# Patient Record
Sex: FEMALE | Race: WHITE | NOT HISPANIC OR LATINO
[De-identification: names, ages, dates, MRNs, and addresses within clinical notes are randomized per-mention and may not be internally consistent; named-entity substitution may affect disease eponyms.]

---

## 2016-12-31 NOTE — ED PROVIDER NOTE - CARE PLAN
Principal Discharge DX:	DKA (diabetic ketoacidoses)  Secondary Diagnosis:	GIB (gastrointestinal bleeding)

## 2016-12-31 NOTE — ED PROVIDER NOTE - OBJECTIVE STATEMENT
50 y/o w/f IDDM with abdominal pain, N/V, hematemesis, no po intake x two days,  ketones on breath, glucose > 400 at home

## 2016-12-31 NOTE — H&P ADULT. - HISTORY OF PRESENT ILLNESS
52 y/o w/f IDDM with abdominal pain, N/V, hematemesis, no po intake x two days,  ketones on breath, glucose > 400 at home, in the er found to be in DKA

## 2016-12-31 NOTE — ED ADULT NURSE NOTE - OBJECTIVE STATEMENT
patient arrived to ED lethargic in DKA with a reported elevated blood sugar for patient's partner.  patient has insulin pump (detached and kept by partner).  patient to receive fluid resuscitation, MD Boyd at bedside to establish EJ IV access.  pending initial lab results.  EKG completed, blood sugar too high to be read by glucometer.

## 2016-12-31 NOTE — H&P ADULT. - ASSESSMENT
50 y/o w/f IDDM with abdominal pain, N/V, hematemesis, no po intake x two days,  ketones on breath, glucose > 400 at home, in the er found to be in DKA

## 2016-12-31 NOTE — ED PROVIDER NOTE - CONSTITUTIONAL, MLM
normal... Well appearing, well nourished, awake, alert, oriented to person, place, time/situation and in moderate distress.

## 2017-01-01 NOTE — DIETITIAN INITIAL EVALUATION ADULT. - PERTINENT LABORATORY DATA
1/1 H/H 8.9/27.5 Na 146 Cl 112 Glu 159 BUN/Crea 41/3.4 Ca 8.2 1/1 H/H 8.9/27.5 Na 146 Cl 112 Glu 159 BUN/Crea 41/3.4 Ca 8.2 12/31 HgbA1c 12.7

## 2017-01-01 NOTE — DIETITIAN INITIAL EVALUATION ADULT. - OTHER INFO
Pt sleeping soundly in bed. Breakfast tray at bedside, untouched. Written information re: consistent carb diet left at pt's bedside with name and number of RD.

## 2017-01-03 ENCOUNTER — TRANSCRIPTION ENCOUNTER (OUTPATIENT)
Age: 52
End: 2017-01-03

## 2017-01-03 NOTE — PHYSICAL THERAPY INITIAL EVALUATION ADULT - ADDITIONAL COMMENTS
Pt lives with her partner in a house /c 3 BILL and none inside, no handrails. Pt was fully independent /c all functional mobility dn ADLs prior to admission. Pt drives and works. Pt reports partner does not work. Pt reports she has an insulin pump that has "malfunctioned" and caused her to go in DKA. She has contracted the pump company but pt was unclear about results/outcome.

## 2017-01-03 NOTE — DISCHARGE NOTE ADULT - NS AS DC AMI ACE CONTRA
other reasons documented by Physician / Nurse Practitioner / Physician Assistant  or Pharmacist for not prescribing an ACEI AND not prescibing an ARB at discharge

## 2017-01-03 NOTE — PHYSICAL THERAPY INITIAL EVALUATION ADULT - BALANCE DISTURBANCE, IDENTIFIED IMPAIRMENT CONTRIBUTE, REHAB EVAL
decreased strength/impaired motor control/impaired postural control/impaired coordination/abnormal muscle tone

## 2017-01-03 NOTE — DISCHARGE NOTE ADULT - NS AS ACTIVITY OBS
Bathing allowed/Do not make important decisions/Do not drive or operate machinery/No Heavy lifting/straining

## 2017-01-03 NOTE — DISCHARGE NOTE ADULT - CARE PROVIDER_API CALL
Andrew Rahman), Cardiovascular Disease; Internal Medicine  [address] [place] [address]  Phone: [phone]  Fax: [phone]

## 2017-01-03 NOTE — DISCHARGE NOTE ADULT - NS MD DC FALL RISK RISK
For information on Fall & Injury Prevention, visit [email or web address]

## 2017-01-03 NOTE — DISCHARGE NOTE ADULT - PATIENT PORTAL LINK FT
“You can access the FollowHealth Patient Portal, offered by [facility], by registering with the following website: [email or web address]”

## 2017-01-03 NOTE — DISCHARGE NOTE ADULT - CARE PLAN
Principal Discharge DX:	DKA (diabetic ketoacidoses)  Goal:	resolve  Instructions for follow-up, activity and diet:	pt transferred to [place] for [facility] Principal Discharge DX:	DKA (diabetic ketoacidoses)  Goal:	resolve  Instructions for follow-up, activity and diet:	pt transferred to [place] for [facility] Principal Discharge DX:	DKA (diabetic ketoacidoses)  Goal:	resolve  Instructions for follow-up, activity and diet:	pt transferred to [place] for [facility]

## 2017-01-03 NOTE — DISCHARGE NOTE ADULT - HOSPITAL COURSE
to be completed by attending 52 y/o w/f IDDM with abdominal pain, N/V, hematemesis, no po intake x two days,  ketones on breath, glucose > 400 at home, in the er found to be in DKA  Found to have NSTEMI. Transfer to [place] for cath

## 2017-01-03 NOTE — PHYSICAL THERAPY INITIAL EVALUATION ADULT - IMPAIRMENTS FOUND, PT EVAL
decreased midline orientation/Stairs/arousal, attention, and cognition/gait, locomotion, and balance/muscle strength/tone/poor safety awareness/posture/aerobic capacity/endurance

## 2017-01-03 NOTE — DISCHARGE NOTE ADULT - MEDICATION SUMMARY - MEDICATIONS TO TAKE
I will START or STAY ON the medications listed below when I get home from the hospital:    HYDROmorphone  -- 0.5 milligram(s) intravenous every 3 hours, As Neededfor breakthrough pain  -- Indication: For pain    aspirin 81 mg oral tablet, chewable  -- 1 tab(s) by mouth once a day  -- Indication: For cad    acetaminophen 325 mg oral tablet  -- 2 tab(s) by mouth every 6 hours, As needed, Mild Pain (1 - 3)  -- Indication: For pain    acetaminophen 325 mg oral tablet  -- 2 tab(s) by mouth every 6 hours, As needed, For Temp greater than 38 C (100.4 F)  -- Indication: For pain    HumaLOG  -- 10 unit(s) subcutaneous 3 times a day (before meals) please check accucheck / adjust dose as per it / insulin pump  -- Indication: For DM    insulin glargine  -- 15 unit(s) subcutaneous once a day (at bedtime)  -- Indication: For DM    atorvastatin 40 mg oral tablet  -- 1 tab(s) by mouth once a day (at bedtime)  -- Indication: For HLD    clopidogrel 75 mg oral tablet  -- 1 tab(s) by mouth once a day  -- Indication: For CAD    metoprolol succinate 25 mg oral tablet, extended release  -- 1 tab(s) by mouth once a day  -- Indication: For HTN    amLODIPine 5 mg oral tablet  -- 1 tab(s) by mouth once a day  -- Indication: For HTN    docusate sodium 100 mg oral capsule  -- 1 cap(s) by mouth 3 times a day  -- Indication: For constipation    polyethylene glycol 3350 oral powder for reconstitution  -- 17 gram(s) by mouth once a day (at bedtime)  -- Indication: For constipation    pantoprazole 40 mg oral delayed release tablet  -- 1 tab(s) by mouth once a day (before a meal)  -- Indication: For GERD    hydrALAZINE 25 mg oral tablet  -- 1 tab(s) by mouth every 8 hours  -- Indication: For HTN

## 2017-01-03 NOTE — PHYSICAL THERAPY INITIAL EVALUATION ADULT - GENERAL OBSERVATIONS, REHAB EVAL
Pt received in bed in ICU, was groggy and reporting pain. Pt was medicated prior to PT /c Dilaudid. Pt agreeable /c PT eval

## 2017-01-03 NOTE — PHYSICAL THERAPY INITIAL EVALUATION ADULT - PERTINENT HX OF CURRENT PROBLEM, REHAB EVAL
As per H&P: "50 y/o w/f IDDM with abdominal pain, N/V, hematemesis, no po intake x two days,  ketones on breath, glucose > 400 at home, in the er found to be in DKA"

## 2017-01-03 NOTE — PHYSICAL THERAPY INITIAL EVALUATION ADULT - LEVEL OF INDEPENDENCE: SUPINE/SIT, REHAB EVAL
minimum assist (75% patients effort)/moderate assist (50% patients effort)/pt c/o dizziness but resolved. Pt /c low tone

## 2017-01-03 NOTE — PHYSICAL THERAPY INITIAL EVALUATION ADULT - PLANNED THERAPY INTERVENTIONS, PT EVAL
transfer training/Assess stairs if and when appropriate/gait training/balance training/bed mobility training

## 2017-01-03 NOTE — PHYSICAL THERAPY INITIAL EVALUATION ADULT - PATIENT PROFILE REVIEW, REHAB EVAL
yes/Pt admitted for DKA yes/Pt admitted for DKA and SOB. Pt admits to having DM and an insulin pump. Pt also reports having a acoustic neuroma removed on right side

## 2017-01-03 NOTE — PHYSICAL THERAPY INITIAL EVALUATION ADULT - TRANSFER SAFETY CONCERNS NOTED: SIT/STAND, REHAB EVAL
decreased safety awareness/decreased proprioception/decreased weight-shifting ability/losing balance/decreased balance during turns

## 2017-01-03 NOTE — PHYSICAL THERAPY INITIAL EVALUATION ADULT - GAIT DEVIATIONS NOTED, PT EVAL
decreased step length/decreased velocity of limb motion/decreased christine/decreased stride length/decreased weight-shifting ability

## 2017-01-03 NOTE — PHYSICAL THERAPY INITIAL EVALUATION ADULT - IMPAIRMENTS CONTRIBUTING TO GAIT DEVIATIONS, PT EVAL
impaired coordination/decreased strength/impaired postural control/impaired balance/cognition/impaired motor control

## 2017-01-03 NOTE — PHYSICAL THERAPY INITIAL EVALUATION ADULT - SKIN COLOR/CHARACTERISTICS
Swelling t/o LUE. Puffiness to face noted and right side facial droop. Pt says this is not new. Pt had a "brain tumor" removed

## 2017-01-05 ENCOUNTER — INPATIENT (INPATIENT)
Facility: HOSPITAL | Age: 52
LOS: 5 days | Discharge: HOME | DRG: 281 | End: 2017-01-11
Attending: INTERNAL MEDICINE | Admitting: INTERNAL MEDICINE
Payer: COMMERCIAL

## 2017-01-05 VITALS
DIASTOLIC BLOOD PRESSURE: 71 MMHG | HEART RATE: 87 BPM | RESPIRATION RATE: 19 BRPM | SYSTOLIC BLOOD PRESSURE: 143 MMHG | TEMPERATURE: 98 F | OXYGEN SATURATION: 97 %

## 2017-01-05 DIAGNOSIS — I21.4 NON-ST ELEVATION (NSTEMI) MYOCARDIAL INFARCTION: ICD-10-CM

## 2017-01-05 DIAGNOSIS — D36.10 BENIGN NEOPLASM OF PERIPHERAL NERVES AND AUTONOMIC NERVOUS SYSTEM, UNSPECIFIED: Chronic | ICD-10-CM

## 2017-01-05 DIAGNOSIS — T14.90 INJURY, UNSPECIFIED: Chronic | ICD-10-CM

## 2017-01-05 LAB
APTT BLD: 28.4 SEC — SIGNIFICANT CHANGE UP (ref 27.5–37.4)
CK MB CFR SERPL CALC: 1 NG/ML — SIGNIFICANT CHANGE UP (ref 0–3.8)
CK SERPL-CCNC: 21 U/L — LOW (ref 25–170)
INR BLD: 1.04 RATIO — SIGNIFICANT CHANGE UP (ref 0.88–1.16)
PROTHROM AB SERPL-ACNC: 11.2 SEC — SIGNIFICANT CHANGE UP (ref 10–13.1)
TROPONIN T SERPL-MCNC: 0.34 NG/ML — HIGH (ref 0–0.06)

## 2017-01-05 PROCEDURE — 93010 ELECTROCARDIOGRAM REPORT: CPT

## 2017-01-05 RX ORDER — CLOPIDOGREL BISULFATE 75 MG/1
75 TABLET, FILM COATED ORAL DAILY
Qty: 0 | Refills: 0 | Status: DISCONTINUED | OUTPATIENT
Start: 2017-01-05 | End: 2017-01-07

## 2017-01-05 RX ORDER — HYDRALAZINE HCL 50 MG
25 TABLET ORAL EVERY 8 HOURS
Qty: 0 | Refills: 0 | Status: DISCONTINUED | OUTPATIENT
Start: 2017-01-05 | End: 2017-01-11

## 2017-01-05 RX ORDER — METOPROLOL TARTRATE 50 MG
25 TABLET ORAL DAILY
Qty: 0 | Refills: 0 | Status: DISCONTINUED | OUTPATIENT
Start: 2017-01-05 | End: 2017-01-11

## 2017-01-05 RX ORDER — INSULIN LISPRO 100/ML
VIAL (ML) SUBCUTANEOUS AT BEDTIME
Qty: 0 | Refills: 0 | Status: DISCONTINUED | OUTPATIENT
Start: 2017-01-05 | End: 2017-01-11

## 2017-01-05 RX ORDER — ATORVASTATIN CALCIUM 80 MG/1
40 TABLET, FILM COATED ORAL AT BEDTIME
Qty: 0 | Refills: 0 | Status: DISCONTINUED | OUTPATIENT
Start: 2017-01-05 | End: 2017-01-11

## 2017-01-05 RX ORDER — HEPARIN SODIUM 5000 [USP'U]/ML
INJECTION INTRAVENOUS; SUBCUTANEOUS
Qty: 25000 | Refills: 0 | Status: DISCONTINUED | OUTPATIENT
Start: 2017-01-05 | End: 2017-01-06

## 2017-01-05 RX ORDER — SODIUM CHLORIDE 9 MG/ML
1000 INJECTION INTRAMUSCULAR; INTRAVENOUS; SUBCUTANEOUS
Qty: 0 | Refills: 0 | Status: COMPLETED | OUTPATIENT
Start: 2017-01-05 | End: 2017-01-06

## 2017-01-05 RX ORDER — INSULIN GLARGINE 100 [IU]/ML
10 INJECTION, SOLUTION SUBCUTANEOUS AT BEDTIME
Qty: 0 | Refills: 0 | Status: DISCONTINUED | OUTPATIENT
Start: 2017-01-05 | End: 2017-01-06

## 2017-01-05 RX ORDER — DEXTROSE 50 % IN WATER 50 %
25 SYRINGE (ML) INTRAVENOUS ONCE
Qty: 0 | Refills: 0 | Status: DISCONTINUED | OUTPATIENT
Start: 2017-01-05 | End: 2017-01-11

## 2017-01-05 RX ORDER — DOCUSATE SODIUM 100 MG
100 CAPSULE ORAL THREE TIMES A DAY
Qty: 0 | Refills: 0 | Status: DISCONTINUED | OUTPATIENT
Start: 2017-01-05 | End: 2017-01-11

## 2017-01-05 RX ORDER — PANTOPRAZOLE SODIUM 20 MG/1
40 TABLET, DELAYED RELEASE ORAL
Qty: 0 | Refills: 0 | Status: DISCONTINUED | OUTPATIENT
Start: 2017-01-05 | End: 2017-01-11

## 2017-01-05 RX ORDER — HEPARIN SODIUM 5000 [USP'U]/ML
4000 INJECTION INTRAVENOUS; SUBCUTANEOUS EVERY 6 HOURS
Qty: 0 | Refills: 0 | Status: DISCONTINUED | OUTPATIENT
Start: 2017-01-05 | End: 2017-01-06

## 2017-01-05 RX ORDER — INSULIN LISPRO 100/ML
3 VIAL (ML) SUBCUTANEOUS
Qty: 0 | Refills: 0 | Status: DISCONTINUED | OUTPATIENT
Start: 2017-01-05 | End: 2017-01-06

## 2017-01-05 RX ORDER — DEXTROSE 50 % IN WATER 50 %
12.5 SYRINGE (ML) INTRAVENOUS ONCE
Qty: 0 | Refills: 0 | Status: DISCONTINUED | OUTPATIENT
Start: 2017-01-05 | End: 2017-01-11

## 2017-01-05 RX ORDER — AMLODIPINE BESYLATE 2.5 MG/1
5 TABLET ORAL DAILY
Qty: 0 | Refills: 0 | Status: DISCONTINUED | OUTPATIENT
Start: 2017-01-05 | End: 2017-01-09

## 2017-01-05 RX ORDER — DEXTROSE 50 % IN WATER 50 %
1 SYRINGE (ML) INTRAVENOUS ONCE
Qty: 0 | Refills: 0 | Status: DISCONTINUED | OUTPATIENT
Start: 2017-01-05 | End: 2017-01-11

## 2017-01-05 RX ORDER — GLUCAGON INJECTION, SOLUTION 0.5 MG/.1ML
1 INJECTION, SOLUTION SUBCUTANEOUS ONCE
Qty: 0 | Refills: 0 | Status: DISCONTINUED | OUTPATIENT
Start: 2017-01-05 | End: 2017-01-11

## 2017-01-05 RX ORDER — SODIUM CHLORIDE 9 MG/ML
1000 INJECTION, SOLUTION INTRAVENOUS
Qty: 0 | Refills: 0 | Status: DISCONTINUED | OUTPATIENT
Start: 2017-01-05 | End: 2017-01-11

## 2017-01-05 RX ORDER — ASPIRIN/CALCIUM CARB/MAGNESIUM 324 MG
81 TABLET ORAL DAILY
Qty: 0 | Refills: 0 | Status: DISCONTINUED | OUTPATIENT
Start: 2017-01-05 | End: 2017-01-11

## 2017-01-05 RX ORDER — INSULIN LISPRO 100/ML
VIAL (ML) SUBCUTANEOUS
Qty: 0 | Refills: 0 | Status: DISCONTINUED | OUTPATIENT
Start: 2017-01-05 | End: 2017-01-11

## 2017-01-05 RX ORDER — ACETYLCYSTEINE 200 MG/ML
1200 VIAL (ML) MISCELLANEOUS
Qty: 0 | Refills: 0 | Status: COMPLETED | OUTPATIENT
Start: 2017-01-05 | End: 2017-01-07

## 2017-01-05 RX ADMIN — HEPARIN SODIUM 900 UNIT(S)/HR: 5000 INJECTION INTRAVENOUS; SUBCUTANEOUS at 22:34

## 2017-01-05 RX ADMIN — AMLODIPINE BESYLATE 5 MILLIGRAM(S): 2.5 TABLET ORAL at 20:44

## 2017-01-05 RX ADMIN — Medication 1200 MILLIGRAM(S): at 22:35

## 2017-01-05 RX ADMIN — SODIUM CHLORIDE 75 MILLILITER(S): 9 INJECTION INTRAMUSCULAR; INTRAVENOUS; SUBCUTANEOUS at 20:52

## 2017-01-05 RX ADMIN — Medication 81 MILLIGRAM(S): at 18:58

## 2017-01-05 RX ADMIN — Medication 2: at 20:03

## 2017-01-05 RX ADMIN — Medication 1: at 22:35

## 2017-01-05 RX ADMIN — Medication 25 MILLIGRAM(S): at 20:44

## 2017-01-05 RX ADMIN — PANTOPRAZOLE SODIUM 40 MILLIGRAM(S): 20 TABLET, DELAYED RELEASE ORAL at 18:58

## 2017-01-05 RX ADMIN — Medication 3 UNIT(S): at 20:02

## 2017-01-05 RX ADMIN — Medication 25 MILLIGRAM(S): at 18:58

## 2017-01-05 RX ADMIN — ATORVASTATIN CALCIUM 40 MILLIGRAM(S): 80 TABLET, FILM COATED ORAL at 20:44

## 2017-01-05 RX ADMIN — CLOPIDOGREL BISULFATE 75 MILLIGRAM(S): 75 TABLET, FILM COATED ORAL at 18:58

## 2017-01-05 RX ADMIN — INSULIN GLARGINE 10 UNIT(S): 100 INJECTION, SOLUTION SUBCUTANEOUS at 22:34

## 2017-01-05 NOTE — PATIENT PROFILE ADULT. - ABILITY TO HEAR (WITH HEARING AID OR HEARING APPLIANCE IF NORMALLY USED):
Mildly to Moderately Impaired: difficulty hearing in some environments or speaker may need to increase volume or speak distinctly/Deaf right ear

## 2017-01-05 NOTE — H&P CARDIOLOGY - PMH
Acoustic neuroma    Diabetes  insulin pump  HTN (hypertension) Acoustic neuroma    CKD (chronic kidney disease) stage 4, GFR 15-29 ml/min    Diabetes  insulin pump  HLD (hyperlipidemia)    HTN (hypertension)    Pancreatitis

## 2017-01-05 NOTE — H&P CARDIOLOGY - FAMILY HISTORY
No pertinent family history in first degree relatives Mother  Still living? Unknown  FH: lung cancer, Age at diagnosis: Age Unknown  FH: CHF (congestive heart failure), Age at diagnosis: Age Unknown     Father  Still living? Unknown  FH: emphysema, Age at diagnosis: Age Unknown     Sibling  Still living? Unknown  FH: CVA (cerebrovascular accident), Age at diagnosis: Age Unknown

## 2017-01-05 NOTE — H&P CARDIOLOGY - HISTORY OF PRESENT ILLNESS
52 yo female PMH IDDM (A1c 12.7), acoustic neuroma, HTN, CKD presents to [facility] on 12/31/16 with insulin pump failure and c/o abdominal pain, N/V, hematemesis, decrease po intake PTA. Patient blood glucose >400, patient was admitted to ICU and treat for DKA. Anion gap is now closed, diabetes now managed according to sliding scale. While in the ICU, on 12/31- 1/1/17 cardiac biomarkers x 3 were uptrending (10.8-> 16.3 -> 10.5  Patient transferred to [facility] for NSTEMI on 1/5/17.     Upon arrival 52 yo female PMH DMT1 (A1c 12.7, insulin pump at home), acoustic neuroma (s/p removal with residual right eye droop and decreased hearing in right ear), HTN, HLD CKD presents to [facility] on 12/31/16 with insulin pump complications and c/o epigastric pain, N/V, decrease po intake two days PTA. In the ER blood glucose >400, patient was admitted to ICU and treat for DKA. Anion gap is now closed, diabetes now managed according to sliding scale with IV insulin. While in the ICU, on 12/31- 1/1/17 cardiac biomarkers x 3 were uptrending (10.8-> 16.3 -> 10.5)  Patient transferred to [facility] for NSTEMI on 1/5/17.     Upon arrival 50 yo female PMH DMT1 (A1c 12.7, insulin pump at home), acoustic neuroma (s/p removal with residual right eye droop and decreased hearing in right ear), HTN, HLD CKD presents to [facility] on 12/31/16 with insulin pump complications and c/o epigastric pain, N/V, decrease po intake two days PTA. In the ER blood glucose >400, patient was admitted to ICU and treat for DKA. Anion gap is now closed, diabetes now managed according to sliding scale with IV insulin. While in the ICU, on 12/31- 1/1/17 troponin I x 3 peak 16.3. Patient transferred to [facility] for NSTEMI on 1/5/17.    off note: Patient was dx with pancreatitis in Nov 2016, CTA on 1/1/17 reports findings are suggestive of pancreatitis/duodenitis.   1/2017 TTE: Stage I diastolic dysfunction, EF 55%  Upon arrival patient denies cheat pain, palpitations, SOB, n/v. 50 yo female PMH DMT1 (A1c 12.7, insulin pump at home), acoustic neuroma (s/p removal with residual right eye droop and decreased hearing in right ear), HTN, HLD CKD presents to [facility] on 12/31/16 with insulin pump complications and c/o epigastric pain, N/V, decrease po intake and cough two days PTA. In the ER blood glucose >400, patient was admitted to ICU and treat for DKA. Anion gap is now closed, diabetes now managed according to sliding scale with IV insulin. While in the ICU, on 12/31- 1/1/17 troponin I x 3 peak 16.3. Patient was medically treated and stabilized now transferred to [facility] for NSTEMI on 1/5/17. Upon arrival patient denies cheat pain, palpitations, SOB, n/v.    off note:   Patient was dx with pancreatitis in Nov 2016, follow up CT Abdomen on 1/1/17 reports findings are suggestive of pancreatitis/duodenitis.   1/2017 TTE: Stage I diastolic dysfunction, EF 55% 52 yo female PMH DMT1 (A1c 12.7, insulin pump at home), acoustic neuroma (s/p removal with residual right eye droop and decreased hearing in right ear), HTN, HLD CKD presents to [facility] on 12/31/16 with insulin pump complications and c/o epigastric pain, N/V, decrease po intake and cough two days PTA. In the ER blood glucose >400, patient was admitted to ICU and treat for DKA. Anion gap is now closed, diabetes now managed according to sliding scale with IV insulin. While in the ICU, on 12/31- 1/1/17 troponin I x 3 uptrending- peak 16.3. Patient was medically treated and stabilized now transferred to [facility] for NSTEMI on 1/5/17. Upon arrival patient denies cheat pain, palpitations, SOB, n/v.    off note:   Patient was dx with pancreatitis in Nov 2016, follow up CT Abdomen on 1/1/17 reports findings are suggestive of pancreatitis/duodenitis.   1/2017 TTE: Stage I diastolic dysfunction, EF 55%. 50 yo female PMH DMT1 (A1c 12.7, insulin pump at home), acoustic neuroma (s/p removal with residual right eye droop and decreased hearing in right ear), HTN, HLD CKD stage 4 (GFR 24, Cr 2.3) presents to [facility] on 12/31/16 with insulin pump complications and c/o epigastric pain, N/V, decrease po intake and cough two days PTA. In the ER blood glucose >400, patient was admitted to ICU and treat for DKA. Anion gap is now closed, diabetes now managed according to sliding scale with IV insulin. While in the ICU, on 12/31- 1/1/17 troponin I x 3 uptrending- peak 16.3. Patient was medically treated and stabilized now transferred to [facility] for NSTEMI on 1/5/17. Upon arrival patient denies chest pain, palpitations, SOB, n/v, PND, orthopnea    off note:  Patient was dx with pancreatitis in Nov 2016, follow up CT Abdomen on 1/1/17 reports findings are suggestive of pancreatitis/duodenitis.   1/2017 TTE: Stage I diastolic dysfunction, EF 55%.   BC x1: no growth  U/A: negative 52 yo female PMH DMT1 (A1c 12.7, insulin pump at home), acoustic neuroma (s/p removal with residual right eye droop and decreased hearing in right ear), HTN, HLD CKD stage 4 (GFR 24, Cr 2.3) presents to [facility] on 12/31/16 with insulin pump complications and c/o epigastric pain, N/V, decrease po intake and cough two days PTA. In the ER blood glucose >400, patient was admitted to ICU and treat for DKA. Anion gap is now closed, diabetes now managed according to sliding scale with IV insulin. While in the ICU, on 12/31- 1/1/17 troponin I x 3 uptrending- peak 16.3. Patient was medically treated and stabilized now transferred to [facility] for NSTEMI on 1/5/17. Upon arrival patient denies chest pain, palpitations, SOB, n/v, PND, orthopnea    Endocrine: Dr. Laith Shrestha  PCP: Dr. Lea Liriano  off note:  Patient was dx with pancreatitis in Nov 2016, follow up CT Abdomen on 1/1/17 reports findings are suggestive of pancreatitis/duodenitis.   1/2017 TTE: Stage I diastolic dysfunction, EF 55%.   BC x1: no growth  U/A: negative 52 yo female PMH DMT1 (A1c 12.7, [employer] insulin pump at home), acoustic neuroma (s/p removal with residual right eye droop and decreased hearing in right ear), HTN, HLD CKD stage 4 (GFR 24, Cr 2.3) presents to [facility] on 12/31/16 with insulin pump complications and c/o epigastric pain, N/V, decrease po intake and cough two days PTA. In the ER blood glucose >400, patient was admitted to ICU and treat for DKA. Anion gap is now closed, diabetes managed according to sliding scale with IV insulin. While in the ICU, on 12/31- 1/1/17 troponin I x 3 uptrending- peak 16.3. Patient was medically treated and stabilized now transferred to [facility] for NSTEMI on 1/5/17. Upon arrival patient denies chest pain, palpitations, SOB, n/v, PND, orthopnea    Endocrine: Dr. Laith Shrestha  PCP: Dr. Jd Liriano  off note:  Patient was dx with pancreatitis in Nov 2016, follow up CT Abdomen on 1/1/17 reports findings are suggestive of pancreatitis/duodenitis.   1/2017 TTE: Stage I diastolic dysfunction, EF 55%.   BC x1: no growth  U/A: negative

## 2017-01-06 ENCOUNTER — TRANSCRIPTION ENCOUNTER (OUTPATIENT)
Age: 52
End: 2017-01-06

## 2017-01-06 LAB
AMYLASE UR-CCNC: 26 U/L — SIGNIFICANT CHANGE UP
ANION GAP SERPL CALC-SCNC: 17 MMOL/L — SIGNIFICANT CHANGE UP (ref 5–17)
APTT BLD: 43.1 SEC — HIGH (ref 27.5–37.4)
BUN SERPL-MCNC: 23 MG/DL — SIGNIFICANT CHANGE UP (ref 7–23)
CALCIUM SERPL-MCNC: 8.9 MG/DL — SIGNIFICANT CHANGE UP (ref 8.4–10.5)
CALCIUM UR-MCNC: 2 MG/DL — SIGNIFICANT CHANGE UP
CHLORIDE SERPL-SCNC: 100 MMOL/L — SIGNIFICANT CHANGE UP (ref 96–108)
CHLORIDE UR-SCNC: 71 MMOL/L — SIGNIFICANT CHANGE UP
CK MB BLD-MCNC: 5.9 % — HIGH (ref 0–3.5)
CK MB CFR SERPL CALC: 1 NG/ML — SIGNIFICANT CHANGE UP (ref 0–3.8)
CK SERPL-CCNC: 17 U/L — LOW (ref 25–170)
CO2 SERPL-SCNC: 22 MMOL/L — SIGNIFICANT CHANGE UP (ref 22–31)
CREAT ?TM UR-MCNC: 22 MG/DL — SIGNIFICANT CHANGE UP
CREAT SERPL-MCNC: 2.26 MG/DL — HIGH (ref 0.5–1.3)
GLUCOSE SERPL-MCNC: 263 MG/DL — HIGH (ref 70–99)
HCT VFR BLD CALC: 28 % — LOW (ref 34.5–45)
HGB BLD-MCNC: 9.4 G/DL — LOW (ref 11.5–15.5)
INR BLD: 1.02 RATIO — SIGNIFICANT CHANGE UP (ref 0.88–1.16)
MCHC RBC-ENTMCNC: 29.1 PG — SIGNIFICANT CHANGE UP (ref 27–34)
MCHC RBC-ENTMCNC: 33.7 GM/DL — SIGNIFICANT CHANGE UP (ref 32–36)
MCV RBC AUTO: 86.4 FL — SIGNIFICANT CHANGE UP (ref 80–100)
OSMOLALITY UR: 331 MOS/KG — SIGNIFICANT CHANGE UP (ref 300–900)
PLATELET # BLD AUTO: 274 K/UL — SIGNIFICANT CHANGE UP (ref 150–400)
POTASSIUM SERPL-MCNC: 4.1 MMOL/L — SIGNIFICANT CHANGE UP (ref 3.5–5.3)
POTASSIUM SERPL-SCNC: 4.1 MMOL/L — SIGNIFICANT CHANGE UP (ref 3.5–5.3)
POTASSIUM UR-SCNC: 12 MMOL/L — SIGNIFICANT CHANGE UP
PROTHROM AB SERPL-ACNC: 11 SEC — SIGNIFICANT CHANGE UP (ref 10–13.1)
RBC # BLD: 3.24 M/UL — LOW (ref 3.8–5.2)
RBC # FLD: 12.6 % — SIGNIFICANT CHANGE UP (ref 10.3–14.5)
SODIUM SERPL-SCNC: 139 MMOL/L — SIGNIFICANT CHANGE UP (ref 135–145)
SODIUM UR-SCNC: 94 MMOL/L — SIGNIFICANT CHANGE UP
TROPONIN T SERPL-MCNC: 0.35 NG/ML — HIGH (ref 0–0.06)
WBC # BLD: 12.7 K/UL — HIGH (ref 3.8–10.5)
WBC # FLD AUTO: 12.7 K/UL — HIGH (ref 3.8–10.5)

## 2017-01-06 PROCEDURE — 99255 IP/OBS CONSLTJ NEW/EST HI 80: CPT

## 2017-01-06 PROCEDURE — 93458 L HRT ARTERY/VENTRICLE ANGIO: CPT | Mod: 26,GC

## 2017-01-06 PROCEDURE — 99255 IP/OBS CONSLTJ NEW/EST HI 80: CPT | Mod: GC

## 2017-01-06 PROCEDURE — 99222 1ST HOSP IP/OBS MODERATE 55: CPT | Mod: GC

## 2017-01-06 RX ORDER — ACETAMINOPHEN 500 MG
650 TABLET ORAL EVERY 6 HOURS
Qty: 0 | Refills: 0 | Status: DISCONTINUED | OUTPATIENT
Start: 2017-01-06 | End: 2017-01-11

## 2017-01-06 RX ORDER — INSULIN LISPRO 100/ML
6 VIAL (ML) SUBCUTANEOUS ONCE
Qty: 0 | Refills: 0 | Status: COMPLETED | OUTPATIENT
Start: 2017-01-06 | End: 2017-01-06

## 2017-01-06 RX ORDER — SODIUM CHLORIDE 9 MG/ML
1000 INJECTION INTRAMUSCULAR; INTRAVENOUS; SUBCUTANEOUS
Qty: 0 | Refills: 0 | Status: DISCONTINUED | OUTPATIENT
Start: 2017-01-06 | End: 2017-01-11

## 2017-01-06 RX ORDER — INSULIN GLARGINE 100 [IU]/ML
12 INJECTION, SOLUTION SUBCUTANEOUS AT BEDTIME
Qty: 0 | Refills: 0 | Status: DISCONTINUED | OUTPATIENT
Start: 2017-01-06 | End: 2017-01-07

## 2017-01-06 RX ORDER — INSULIN LISPRO 100/ML
6 VIAL (ML) SUBCUTANEOUS
Qty: 0 | Refills: 0 | Status: DISCONTINUED | OUTPATIENT
Start: 2017-01-06 | End: 2017-01-07

## 2017-01-06 RX ORDER — INSULIN LISPRO 100/ML
5 VIAL (ML) SUBCUTANEOUS
Qty: 0 | Refills: 0 | Status: DISCONTINUED | OUTPATIENT
Start: 2017-01-06 | End: 2017-01-06

## 2017-01-06 RX ADMIN — Medication 1200 MILLIGRAM(S): at 12:34

## 2017-01-06 RX ADMIN — ATORVASTATIN CALCIUM 40 MILLIGRAM(S): 80 TABLET, FILM COATED ORAL at 22:05

## 2017-01-06 RX ADMIN — Medication 1: at 08:33

## 2017-01-06 RX ADMIN — Medication 3 UNIT(S): at 12:34

## 2017-01-06 RX ADMIN — Medication 650 MILLIGRAM(S): at 22:06

## 2017-01-06 RX ADMIN — AMLODIPINE BESYLATE 5 MILLIGRAM(S): 2.5 TABLET ORAL at 17:59

## 2017-01-06 RX ADMIN — INSULIN GLARGINE 12 UNIT(S): 100 INJECTION, SOLUTION SUBCUTANEOUS at 22:14

## 2017-01-06 RX ADMIN — Medication 81 MILLIGRAM(S): at 05:16

## 2017-01-06 RX ADMIN — Medication 6 UNIT(S): at 02:30

## 2017-01-06 RX ADMIN — Medication 25 MILLIGRAM(S): at 05:16

## 2017-01-06 RX ADMIN — Medication 100 MILLIGRAM(S): at 22:06

## 2017-01-06 RX ADMIN — Medication 650 MILLIGRAM(S): at 22:36

## 2017-01-06 RX ADMIN — SODIUM CHLORIDE 75 MILLILITER(S): 9 INJECTION INTRAMUSCULAR; INTRAVENOUS; SUBCUTANEOUS at 04:18

## 2017-01-06 RX ADMIN — Medication 100 MILLIGRAM(S): at 12:34

## 2017-01-06 RX ADMIN — Medication 25 MILLIGRAM(S): at 22:06

## 2017-01-06 RX ADMIN — Medication 6: at 17:58

## 2017-01-06 RX ADMIN — CLOPIDOGREL BISULFATE 75 MILLIGRAM(S): 75 TABLET, FILM COATED ORAL at 05:16

## 2017-01-06 RX ADMIN — Medication 5 UNIT(S): at 17:58

## 2017-01-06 RX ADMIN — PANTOPRAZOLE SODIUM 40 MILLIGRAM(S): 20 TABLET, DELAYED RELEASE ORAL at 05:16

## 2017-01-06 RX ADMIN — Medication 3: at 12:34

## 2017-01-06 RX ADMIN — Medication 2: at 22:14

## 2017-01-06 RX ADMIN — Medication 1200 MILLIGRAM(S): at 22:07

## 2017-01-06 RX ADMIN — HEPARIN SODIUM 1050 UNIT(S)/HR: 5000 INJECTION INTRAVENOUS; SUBCUTANEOUS at 05:18

## 2017-01-06 RX ADMIN — Medication 3 UNIT(S): at 08:33

## 2017-01-06 RX ADMIN — Medication 25 MILLIGRAM(S): at 12:34

## 2017-01-06 NOTE — DISCHARGE NOTE ADULT - MEDICATION SUMMARY - MEDICATIONS TO STOP TAKING
I will STOP taking the medications listed below when I get home from the hospital:    HYDROmorphone  -- 0.5 milligram(s) intravenous every 3 hours, As Neededfor breakthrough pain  (hospital)

## 2017-01-06 NOTE — DISCHARGE NOTE ADULT - FINDINGS/TREATMENT
luminal irregularities. 1/6/17: Cardiac Catheterization: CORONARY VESSELS: The coronary circulation is right dominant.  LM:   --  LM: Normal.  LAD:   --  LAD: Angiography showed minor luminal irregularities with no  flow limiting lesions.  CX:   --  Circumflex: Normal.  RCA:   --  RCA: Normal.  COMPLICATIONS: There were no complications.  DIAGNOSTIC RECOMMENDATIONS: The patient should continue with the present  medications.

## 2017-01-06 NOTE — DISCHARGE NOTE ADULT - CONDITION (STATED IN TERMS THAT PERMIT A SPECIFIC MEASURABLE COMPARISON WITH CONDITION ON ADMISSION):
afebrile, vital signs stable. endocrine evaluated, recommendations made and teaching with patient done

## 2017-01-06 NOTE — PROVIDER CONTACT NOTE (OTHER) - ACTION/TREATMENT ORDERED:
Fs checked immediately. , oxygen 2L applied. NOtiifed NP aries right away. NP ordered 6 unit of humolog insulin, insulin given. will re-check FS and cont to monitor.

## 2017-01-06 NOTE — DISCHARGE NOTE ADULT - HOSPITAL COURSE
52 yo female PMH DMT1 (A1c 12.7, [employer]tr[employer] insulin pump at home), acoustic neuroma (s/p removal with residual right eye droop and decreased hearing in right ear), HTN, HLD CKD stage 4 (GFR 24, Cr 2.3) presents to [facility] on 12/31/16 with insulin pump complications and c/o epigastric pain, N/V, decrease po intake and cough two days PTA. In the ER blood glucose >400, patient was admitted to ICU and treat for DKA. Anion gap is now closed, diabetes managed according to sliding scale with IV insulin. While in the ICU, on 12/31- 1/1/17 troponin I x 3 uptrending- peak 16.3. Patient was medically treated and stabilized now transferred to [facility] for NSTEMI on 1/5/17. Upon arrival patient denies chest pain, palpitations, SOB, n/v, PND, orthopnea.  Cardiac cath 1/6/17 luminal irregularities.

## 2017-01-06 NOTE — DISCHARGE NOTE ADULT - CARE PROVIDER_API CALL
Luis Newberry (DO), Internal Medicine  [address] [place] [address]  Phone: [phone]  Fax: [phone] Luis Newberry (DO), Internal Medicine  [address] [place] [address]  Phone: [phone]  Fax: [phone]    Frankie Woodson), [facility] [place] [address]  Phone: [phone]  Fax: [phone]    Marjorie Ferreira), EndocrinologyMetabDiabetes; Internal Medicine  [address] [place] [address]  Phone: [phone]  Fax: [phone]    Fortunato Beatty), Cardiovascular Disease; Internal Medicine; Interventional Cardiology  [address] [place] [address]  Phone: [phone]  Fax: [phone] Frankie Woodson), Medicine Medicine  [address] [address]  Phone: [phone]  Fax: [phone]    Goran Dykes (KATY), [facility] Medicine; Internal Medicine  [address] [place] [address]  Phone: [phone]  Fax: [phone]    Marjorie Ferreira), EndocrinologyMetabDiabetes; Internal Medicine  [address] [place] [address]  Phone: [phone]  Fax: [phone]

## 2017-01-06 NOTE — DISCHARGE NOTE ADULT - CARE PLAN
Principal Discharge DX:	NSTEMI (non-ST elevated myocardial infarction)  Goal:	risk modification  Instructions for follow-up, activity and diet:	Call your doctor if you have unusual chest pain, pressure, or discomfort, shortness of breath, nausea, vomiting, burping, heartburn, tingling upper body parts, sweating, cold, clammy sking, racing heartbeat  Call 911 if you think you are having a heart attack  Take all cardiac medications as prescribed - notify your doctor if you have any side effects  Follow cardiac diet - avoid fatty & fried foods, don't eat too much red meat, eat lots of fruits & vegetables, dairy products should be low fat  Lose weight if you are overweight  Become more active with walking, gardening, or any other activity that gets you to move  Secondary Diagnosis:	Diabetes  Goal:	disease management  Instructions for follow-up, activity and diet:	HgA1C this admission.  Make sure you get your HgA1c checked every three months.  If you take oral diabetes medications, check your blood glucose two times a day.  If you take insulin, check your blood glucose before meals and at bedtime.  It's important not to skip any meals.  Keep a log of your blood glucose results and always take it with you to your doctor appointments.  Keep a list of your current medications including injectables and over the counter medications and bring this medication list with you to all your doctor appointments.  If you have not seen your ophthalmologist this year call for appointment.  Check your feet daily for redness, sores, or openings. Do not self treat. If no improvement in two days call your primary care physician for an appointment.  Low blood sugar (hypoglycemia) is a blood sugar below 70mg/dl. Check your blood sugar if you feel signs/symptoms of hypoglycemia. If your blood sugar is below 70 take 15 grams of carbohydrates (ex 4 oz of apple juice, 3-4 glucose tablets, or 4-6 oz of regular soda) wait 15 minutes and repeat blood sugar to make sure it comes up above 70.  If your blood sugar is above 70 and you are due for a meal, have a meal.  If you are not due for a meal have a snack.  This snack helps keeps your blood sugar at a safe range.  Secondary Diagnosis:	CKD (chronic kidney disease) stage 4, GFR 15-29 ml/min  Goal:	maintain baseline kidney function  Instructions for follow-up, activity and diet:	Avoid taking (NSAIDs) - (ex: Ibuprofen, Advil, Celebrex, Naprosyn)  Avoid taking any nephrotoxic agents (can harm kidneys) - Intravenous contrast for diagnostic testing, combination cold medications.  Have all medications adjusted for your renal function by your Health Care Provider.  Blood pressure control is important.  Take all medication as prescribed.  Secondary Diagnosis:	HTN (hypertension)  Goal:	Follow up with your medical doctor to establish long term blood pressure treatment goals.  Instructions for follow-up, activity and diet:	Low salt diet  Activity as tolerated.  Take all medication as prescribed.  Follow up with your medical doctor for routine blood pressure monitoring at your next visit.  Notify your doctor if you have any of the following symptoms:   Dizziness, Lightheadedness, Blurry vision, Headache, Chest pain, Shortness of breath  Secondary Diagnosis:	Flu  Goal:	resolved  Instructions for follow-up, activity and diet:	complete Tamiflu course  follow up with your physicians

## 2017-01-06 NOTE — DISCHARGE NOTE ADULT - ADDITIONAL INSTRUCTIONS
Make appointments to follow up with your physicians  You must follow up with an endocrinologist to manage your diabetes.  You should follow up with a gastroenterologist for a colonoscopy  Bring all discharge paperwork including discharge medication list to follow up appointments Make appointments to follow up with your physicians  You have an appointment with your primary physician on 1/13/17 at 2PM  You must follow up with an endocrinologist to manage your diabetes. You have an appointment in the Endocrine Clinic with the Diabetic Educator on 1/18/17 at 2PM and with the doctor (Dr Alarcon) on March 24, 2017 at 10AM  You should follow up with a gastroenterologist for a colonoscopy  Bring all discharge paperwork including discharge medication list to follow up appointments

## 2017-01-06 NOTE — DISCHARGE NOTE ADULT - CARE PROVIDERS DIRECT ADDRESSES
,[email or web address],DirectAddress_Unknown ,[email or web address],[email or web address] ,[email or web address],DirectAddress_Unknown,[email or web address],DirectAddress_Unknown

## 2017-01-06 NOTE — DISCHARGE NOTE ADULT - PROVIDER TOKENS
RADHA:'[id number]' TOKEN:'[id number]',TOKEN:'[id number]',TOKEN:'[id number]',TOKEN:'[id number]' TOKEN:'[id number]',TOKEN:'[id number]',TOKEN:'[id number]'

## 2017-01-06 NOTE — DISCHARGE NOTE ADULT - PLAN OF CARE
risk modification Call your doctor if you have unusual chest pain, pressure, or discomfort, shortness of breath, nausea, vomiting, burping, heartburn, tingling upper body parts, sweating, cold, clammy sking, racing heartbeat  Call 911 if you think you are having a heart attack  Take all cardiac medications as prescribed - notify your doctor if you have any side effects  Follow cardiac diet - avoid fatty & fried foods, don't eat too much red meat, eat lots of fruits & vegetables, dairy products should be low fat  Lose weight if you are overweight  Become more active with walking, gardening, or any other activity that gets you to move HgA1C this admission.  Make sure you get your HgA1c checked every three months.  If you take oral diabetes medications, check your blood glucose two times a day.  If you take insulin, check your blood glucose before meals and at bedtime.  It's important not to skip any meals.  Keep a log of your blood glucose results and always take it with you to your doctor appointments.  Keep a list of your current medications including injectables and over the counter medications and bring this medication list with you to all your doctor appointments.  If you have not seen your ophthalmologist this year call for appointment.  Check your feet daily for redness, sores, or openings. Do not self treat. If no improvement in two days call your primary care physician for an appointment.  Low blood sugar (hypoglycemia) is a blood sugar below 70mg/dl. Check your blood sugar if you feel signs/symptoms of hypoglycemia. If your blood sugar is below 70 take 15 grams of carbohydrates (ex 4 oz of apple juice, 3-4 glucose tablets, or 4-6 oz of regular soda) wait 15 minutes and repeat blood sugar to make sure it comes up above 70.  If your blood sugar is above 70 and you are due for a meal, have a meal.  If you are not due for a meal have a snack.  This snack helps keeps your blood sugar at a safe range. Avoid taking (NSAIDs) - (ex: Ibuprofen, Advil, Celebrex, Naprosyn)  Avoid taking any nephrotoxic agents (can harm kidneys) - Intravenous contrast for diagnostic testing, combination cold medications.  Have all medications adjusted for your renal function by your Health Care Provider.  Blood pressure control is important.  Take all medication as prescribed. disease management maintain baseline kidney function Follow up with your medical doctor to establish long term blood pressure treatment goals. Low salt diet  Activity as tolerated.  Take all medication as prescribed.  Follow up with your medical doctor for routine blood pressure monitoring at your next visit.  Notify your doctor if you have any of the following symptoms:   Dizziness, Lightheadedness, Blurry vision, Headache, Chest pain, Shortness of breath resolved complete Tamiflu course  follow up with your physicians

## 2017-01-06 NOTE — DISCHARGE NOTE ADULT - PATIENT PORTAL LINK FT
“You can access the Follow[employer] Patient Portal, offered by [facility], by registering with the following website: [email or web address]”

## 2017-01-06 NOTE — DISCHARGE NOTE ADULT - MEDICATION SUMMARY - MEDICATIONS TO TAKE
I will START or STAY ON the medications listed below when I get home from the hospital:    aspirin 81 mg oral tablet, chewable  -- 1 tab(s) by mouth once a day  -- Indication: For Heart disease    lisinopril 5 mg oral tablet  -- 1 tab(s) by mouth once a day  -- Indication: For HTN (hypertension)    HumaLOG KwikPen 100 units/mL subcutaneous solution  -- 15 unit(s) subcutaneous 3 times a day (with meals)  -- Indication: For Diabetes    Lantus Solostar Pen 100 units/mL subcutaneous solution  -- 22 unit(s) subcutaneous once a day (at bedtime)  -- Indication: For Diabetes    atorvastatin 40 mg oral tablet  -- 1 tab(s) by mouth once a day (at bedtime)  -- Indication: For Cholesterol management    Tamiflu 75 mg oral capsule  -- 1 tab(s) by mouth 2 times a day for a total of 5 days (stop after AM dose on 1/14/17)  -- Indication: For Flu    metoprolol succinate 25 mg oral tablet, extended release  -- 1 tab(s) by mouth once a day  -- Indication: For Heart disease / hypertension    amLODIPine 10 mg oral tablet  -- 1 tab(s) by mouth once a day  -- Indication: For HTN (hypertension)    ferrous sulfate 325 mg (65 mg elemental iron) oral tablet  -- 1 tab(s) by mouth 3 times a day  -- Indication: For iron suppliment    docusate sodium 100 mg oral capsule  -- 1 cap(s) by mouth 3 times a day  -- Indication: For laxative    polyethylene glycol 3350 oral powder for reconstitution  -- 17 gram(s) by mouth 2 times a day  -- Indication: For laxative    senna oral tablet  -- 2 tab(s) by mouth once a day (at bedtime)  -- Indication: For laxative    pantoprazole 40 mg oral delayed release tablet  -- 1 tab(s) by mouth once a day (before a meal)  -- Indication: For Prophylaxis    hydrALAZINE 25 mg oral tablet  -- 1 tab(s) by mouth every 8 hours  -- Indication: For HTN (hypertension) I will START or STAY ON the medications listed below when I get home from the hospital:    aspirin 81 mg oral tablet, chewable  -- 1 tab(s) by mouth once a day  -- Indication: For Heart disease    lisinopril 5 mg oral tablet  -- 1 tab(s) by mouth once a day  -- Indication: For HTN (hypertension)    Lantus Tayostar Pen 100 units/mL subcutaneous solution  -- 26 unit(s) subcutaneous once a day (at bedtime)  -- Indication: For Diabetes    HumaLOG KwikPen 100 units/mL subcutaneous solution  -- 15 unit(s) subcutaneous 3 times a day (before meals) for Blood Sugar between 80 to 200  -- Indication: For Diabetes    HumaLOG KwikPen 100 units/mL subcutaneous solution  -- 18 unit(s) subcutaneous 3 times a day (before meals) for blood sugar greater 200  -- Indication: For Diabetes    atorvastatin 40 mg oral tablet  -- 1 tab(s) by mouth once a day (at bedtime)  -- Indication: For Cholesterol management    Tamiflu 75 mg oral capsule  -- 1 tab(s) by mouth 2 times a day for a total of 5 days (stop after AM dose on 1/14/17)  -- Indication: For Flu    metoprolol succinate 25 mg oral tablet, extended release  -- 1 tab(s) by mouth once a day  -- Indication: For Heart disease / hypertension    amLODIPine 10 mg oral tablet  -- 1 tab(s) by mouth once a day  -- Indication: For HTN (hypertension)    ferrous sulfate 325 mg (65 mg elemental iron) oral tablet  -- 1 tab(s) by mouth 3 times a day  -- Indication: For iron suppliment    docusate sodium 100 mg oral capsule  -- 1 cap(s) by mouth 3 times a day  -- Indication: For laxative    polyethylene glycol 3350 oral powder for reconstitution  -- 17 gram(s) by mouth 2 times a day  -- Indication: For laxative    senna oral tablet  -- 2 tab(s) by mouth once a day (at bedtime)  -- Indication: For laxative    pantoprazole 40 mg oral delayed release tablet  -- 1 tab(s) by mouth once a day (before a meal)  -- Indication: For Prophylaxis    hydrALAZINE 25 mg oral tablet  -- 1 tab(s) by mouth every 8 hours  -- Indication: For HTN (hypertension)

## 2017-01-06 NOTE — DISCHARGE NOTE ADULT - MEDICATION SUMMARY - MEDICATIONS TO CHANGE
I will SWITCH the dose or number of times a day I take the medications listed below when I get home from the hospital:    HumaLOG  -- 10 unit(s) subcutaneous 3 times a day (before meals) please check accucheck / adjust dose as per it / insulin pump  (HOME)    insulin glargine  -- 15 unit(s) subcutaneous once a day (at bedtime)  (hospital)    lisinopril-hydroCHLOROthiazide 20mg-25mg oral tablet  -- 1 tab(s) by mouth once a day  (HOME)

## 2017-01-07 LAB
ANION GAP SERPL CALC-SCNC: 14 MMOL/L — SIGNIFICANT CHANGE UP (ref 5–17)
BUN SERPL-MCNC: 20 MG/DL — SIGNIFICANT CHANGE UP (ref 7–23)
CALCIUM SERPL-MCNC: 8.7 MG/DL — SIGNIFICANT CHANGE UP (ref 8.4–10.5)
CHLORIDE SERPL-SCNC: 102 MMOL/L — SIGNIFICANT CHANGE UP (ref 96–108)
CO2 SERPL-SCNC: 20 MMOL/L — LOW (ref 22–31)
CREAT SERPL-MCNC: 2.05 MG/DL — HIGH (ref 0.5–1.3)
GLUCOSE SERPL-MCNC: 299 MG/DL — HIGH (ref 70–99)
HCT VFR BLD CALC: 27.8 % — LOW (ref 34.5–45)
HGB BLD-MCNC: 9.4 G/DL — LOW (ref 11.5–15.5)
MCHC RBC-ENTMCNC: 29.4 PG — SIGNIFICANT CHANGE UP (ref 27–34)
MCHC RBC-ENTMCNC: 33.8 GM/DL — SIGNIFICANT CHANGE UP (ref 32–36)
MCV RBC AUTO: 87 FL — SIGNIFICANT CHANGE UP (ref 80–100)
PLATELET # BLD AUTO: 286 K/UL — SIGNIFICANT CHANGE UP (ref 150–400)
POTASSIUM SERPL-MCNC: 4.2 MMOL/L — SIGNIFICANT CHANGE UP (ref 3.5–5.3)
POTASSIUM SERPL-SCNC: 4.2 MMOL/L — SIGNIFICANT CHANGE UP (ref 3.5–5.3)
RBC # BLD: 3.19 M/UL — LOW (ref 3.8–5.2)
RBC # FLD: 12.7 % — SIGNIFICANT CHANGE UP (ref 10.3–14.5)
SODIUM SERPL-SCNC: 136 MMOL/L — SIGNIFICANT CHANGE UP (ref 135–145)
WBC # BLD: 9.8 K/UL — SIGNIFICANT CHANGE UP (ref 3.8–10.5)
WBC # FLD AUTO: 9.8 K/UL — SIGNIFICANT CHANGE UP (ref 3.8–10.5)

## 2017-01-07 PROCEDURE — 99233 SBSQ HOSP IP/OBS HIGH 50: CPT

## 2017-01-07 RX ORDER — HEPARIN SODIUM 5000 [USP'U]/ML
5000 INJECTION INTRAVENOUS; SUBCUTANEOUS EVERY 8 HOURS
Qty: 0 | Refills: 0 | Status: DISCONTINUED | OUTPATIENT
Start: 2017-01-07 | End: 2017-01-11

## 2017-01-07 RX ORDER — INSULIN LISPRO 100/ML
2 VIAL (ML) SUBCUTANEOUS ONCE
Qty: 0 | Refills: 0 | Status: COMPLETED | OUTPATIENT
Start: 2017-01-07 | End: 2017-01-07

## 2017-01-07 RX ORDER — INSULIN LISPRO 100/ML
8 VIAL (ML) SUBCUTANEOUS
Qty: 0 | Refills: 0 | Status: DISCONTINUED | OUTPATIENT
Start: 2017-01-07 | End: 2017-01-08

## 2017-01-07 RX ORDER — INSULIN GLARGINE 100 [IU]/ML
15 INJECTION, SOLUTION SUBCUTANEOUS AT BEDTIME
Qty: 0 | Refills: 0 | Status: DISCONTINUED | OUTPATIENT
Start: 2017-01-07 | End: 2017-01-08

## 2017-01-07 RX ADMIN — Medication 25 MILLIGRAM(S): at 06:22

## 2017-01-07 RX ADMIN — Medication 6 UNIT(S): at 17:14

## 2017-01-07 RX ADMIN — Medication 1200 MILLIGRAM(S): at 12:30

## 2017-01-07 RX ADMIN — Medication 6 UNIT(S): at 11:51

## 2017-01-07 RX ADMIN — Medication 81 MILLIGRAM(S): at 06:22

## 2017-01-07 RX ADMIN — Medication 3: at 09:18

## 2017-01-07 RX ADMIN — Medication 2 UNIT(S): at 20:24

## 2017-01-07 RX ADMIN — Medication 25 MILLIGRAM(S): at 14:03

## 2017-01-07 RX ADMIN — HEPARIN SODIUM 5000 UNIT(S): 5000 INJECTION INTRAVENOUS; SUBCUTANEOUS at 14:03

## 2017-01-07 RX ADMIN — AMLODIPINE BESYLATE 5 MILLIGRAM(S): 2.5 TABLET ORAL at 06:22

## 2017-01-07 RX ADMIN — Medication 25 MILLIGRAM(S): at 22:21

## 2017-01-07 RX ADMIN — Medication 650 MILLIGRAM(S): at 16:12

## 2017-01-07 RX ADMIN — Medication 650 MILLIGRAM(S): at 00:00

## 2017-01-07 RX ADMIN — HEPARIN SODIUM 5000 UNIT(S): 5000 INJECTION INTRAVENOUS; SUBCUTANEOUS at 22:21

## 2017-01-07 RX ADMIN — INSULIN GLARGINE 15 UNIT(S): 100 INJECTION, SOLUTION SUBCUTANEOUS at 22:21

## 2017-01-07 RX ADMIN — Medication 2: at 17:14

## 2017-01-07 RX ADMIN — Medication 6 UNIT(S): at 09:18

## 2017-01-07 RX ADMIN — Medication 100 MILLIGRAM(S): at 06:22

## 2017-01-07 RX ADMIN — Medication 100 MILLIGRAM(S): at 22:21

## 2017-01-07 RX ADMIN — PANTOPRAZOLE SODIUM 40 MILLIGRAM(S): 20 TABLET, DELAYED RELEASE ORAL at 06:22

## 2017-01-07 RX ADMIN — ATORVASTATIN CALCIUM 40 MILLIGRAM(S): 80 TABLET, FILM COATED ORAL at 22:21

## 2017-01-07 RX ADMIN — Medication 3: at 11:51

## 2017-01-07 NOTE — DIETITIAN INITIAL EVALUATION ADULT. - ADHERENCE
diabetic diet. Pt describes diet as being low in CHO; high in chicken, fruit, and vegetables. Pt states she has been trying to eat more whole grains, admits to using some sugar and eating concentrated sweets./fair

## 2017-01-07 NOTE — DIETITIAN INITIAL EVALUATION ADULT. - ENERGY NEEDS
Ht:5ft3in, Wt:164.9lb, BMI:29.3,   IBW:115lb (+/-10%), 143%IBW  Pertinent Information: Pt transferred from [facility] for NSTEMI, now s/p cardiac cath as per chart. No pressure ulcers or edema noted.

## 2017-01-07 NOTE — DIETITIAN INITIAL EVALUATION ADULT. - OTHER INFO
No known food allergies or intolerances reported. Pt denies micronutrient supplementation PTA. Reports being on an insulin pump and checking blood glucose 4 times/day. Results as follows: morning fasting is 150-200mg/dL, before lunch is in the 200s, before dinner is in the 300s, and before bed it is about 250-300mg/dL. Noted pt admitted to [facility] and treated for DKA due to insulin pump malfunction. Currently, pt with an improving appetite- states throat was feeling sore due to vomiting PTA, but has been improving for the past 3 days. Pt reports eating chicken salad for dinner last night and awaiting breakfast at time of interview. Per d/w RN diet texture was changed to mechanical soft yesterday due to sore throat, but pt states she is now able to tolerate regular texture food- d/w team. Pt denies nausea/vomiting at this time. Reports constipation with last BM passed 1 week ago. Assessed knowledge on consistent CHO and heart healthy diet guidelines and identified knowledge gaps. Pt made aware of relationship between diet guidelines and health/disease. Provided education on therapeutic diet guidelines, pt able to verbalize teach-back points and willing to make identified changes.

## 2017-01-07 NOTE — DIETITIAN INITIAL EVALUATION ADULT. - NS AS NUTRI INTERV ED CONTENT
Priority modifications/Recommended modifications/Nutrition relationship to health/disease/Provided extensive verbal and written instruction on consistent CHO and heart healthy diet guidelines. Encouraged pt to follow up with outpatient RD, provided pt with contact information for diabetes wellness program./Purpose of the nutrition education

## 2017-01-07 NOTE — DIETITIAN INITIAL EVALUATION ADULT. - ORAL INTAKE PTA
poor/for about a day PTA to [facility]. Pt states the day of admission to [facility] she vomiting about 15 times. Typically eats 3 meals/day and sometimes a snack.

## 2017-01-07 NOTE — DIETITIAN INITIAL EVALUATION ADULT. - DIET TYPE
mechanical soft/consistent carbohydrate (evening snack)/DASH/TLC (sodium and cholesterol restricted diet)

## 2017-01-07 NOTE — DIETITIAN INITIAL EVALUATION ADULT. - NS AS NUTRI INTERV MEALS SNACK
Encourage PO intake with all meals. Provide food preferences within therapeutic diet when requested. Recommend change diet to consistent CHO, DASH. D/w NP.

## 2017-01-08 LAB
HCT VFR BLD CALC: 26.6 % — LOW (ref 34.5–45)
HGB BLD-MCNC: 8.4 G/DL — LOW (ref 11.5–15.5)
MCHC RBC-ENTMCNC: 26.9 PG — LOW (ref 27–34)
MCHC RBC-ENTMCNC: 31.6 GM/DL — LOW (ref 32–36)
MCV RBC AUTO: 85.3 FL — SIGNIFICANT CHANGE UP (ref 80–100)
PLATELET # BLD AUTO: 433 K/UL — HIGH (ref 150–400)
RBC # BLD: 3.12 M/UL — LOW (ref 3.8–5.2)
RBC # FLD: 13.5 % — SIGNIFICANT CHANGE UP (ref 10.3–14.5)
WBC # BLD: 11.34 K/UL — HIGH (ref 3.8–10.5)
WBC # FLD AUTO: 11.34 K/UL — HIGH (ref 3.8–10.5)

## 2017-01-08 PROCEDURE — 99232 SBSQ HOSP IP/OBS MODERATE 35: CPT

## 2017-01-08 RX ORDER — INSULIN GLARGINE 100 [IU]/ML
20 INJECTION, SOLUTION SUBCUTANEOUS AT BEDTIME
Qty: 0 | Refills: 0 | Status: DISCONTINUED | OUTPATIENT
Start: 2017-01-08 | End: 2017-01-10

## 2017-01-08 RX ORDER — INSULIN LISPRO 100/ML
10 VIAL (ML) SUBCUTANEOUS
Qty: 0 | Refills: 0 | Status: DISCONTINUED | OUTPATIENT
Start: 2017-01-08 | End: 2017-01-09

## 2017-01-08 RX ADMIN — INSULIN GLARGINE 20 UNIT(S): 100 INJECTION, SOLUTION SUBCUTANEOUS at 22:08

## 2017-01-08 RX ADMIN — Medication 81 MILLIGRAM(S): at 12:34

## 2017-01-08 RX ADMIN — Medication 4: at 12:34

## 2017-01-08 RX ADMIN — Medication 2: at 17:06

## 2017-01-08 RX ADMIN — HEPARIN SODIUM 5000 UNIT(S): 5000 INJECTION INTRAVENOUS; SUBCUTANEOUS at 13:53

## 2017-01-08 RX ADMIN — AMLODIPINE BESYLATE 5 MILLIGRAM(S): 2.5 TABLET ORAL at 05:34

## 2017-01-08 RX ADMIN — Medication 2: at 08:15

## 2017-01-08 RX ADMIN — Medication 25 MILLIGRAM(S): at 13:53

## 2017-01-08 RX ADMIN — Medication 8 UNIT(S): at 08:16

## 2017-01-08 RX ADMIN — Medication 25 MILLIGRAM(S): at 05:34

## 2017-01-08 RX ADMIN — Medication 1: at 22:08

## 2017-01-08 RX ADMIN — HEPARIN SODIUM 5000 UNIT(S): 5000 INJECTION INTRAVENOUS; SUBCUTANEOUS at 05:34

## 2017-01-08 RX ADMIN — PANTOPRAZOLE SODIUM 40 MILLIGRAM(S): 20 TABLET, DELAYED RELEASE ORAL at 05:35

## 2017-01-08 RX ADMIN — Medication 100 MILLIGRAM(S): at 08:19

## 2017-01-08 RX ADMIN — Medication 8 UNIT(S): at 17:06

## 2017-01-08 RX ADMIN — Medication 25 MILLIGRAM(S): at 22:07

## 2017-01-08 RX ADMIN — ATORVASTATIN CALCIUM 40 MILLIGRAM(S): 80 TABLET, FILM COATED ORAL at 22:07

## 2017-01-08 RX ADMIN — HEPARIN SODIUM 5000 UNIT(S): 5000 INJECTION INTRAVENOUS; SUBCUTANEOUS at 22:08

## 2017-01-08 RX ADMIN — Medication 8 UNIT(S): at 12:33

## 2017-01-09 LAB
AMYLASE P1 CFR SERPL: 56 U/L — SIGNIFICANT CHANGE UP (ref 25–125)
ANION GAP SERPL CALC-SCNC: 14 MMOL/L — SIGNIFICANT CHANGE UP (ref 5–17)
BUN SERPL-MCNC: 20 MG/DL — SIGNIFICANT CHANGE UP (ref 7–23)
CALCIUM SERPL-MCNC: 9.2 MG/DL — SIGNIFICANT CHANGE UP (ref 8.4–10.5)
CHLORIDE SERPL-SCNC: 101 MMOL/L — SIGNIFICANT CHANGE UP (ref 96–108)
CO2 SERPL-SCNC: 20 MMOL/L — LOW (ref 22–31)
CREAT SERPL-MCNC: 2.43 MG/DL — HIGH (ref 0.5–1.3)
FLUAV H1 2009 PAND RNA SPEC QL NAA+PROBE: DETECTED
GLUCOSE SERPL-MCNC: 222 MG/DL — HIGH (ref 70–99)
HCT VFR BLD CALC: 26.8 % — LOW (ref 34.5–45)
HCT VFR BLD CALC: 27.6 % — LOW (ref 34.5–45)
HGB BLD-MCNC: 8.4 G/DL — LOW (ref 11.5–15.5)
HGB BLD-MCNC: 9.1 G/DL — LOW (ref 11.5–15.5)
LIDOCAIN IGE QN: 90 U/L — HIGH (ref 7–60)
MCHC RBC-ENTMCNC: 26.7 PG — LOW (ref 27–34)
MCHC RBC-ENTMCNC: 28.6 PG — SIGNIFICANT CHANGE UP (ref 27–34)
MCHC RBC-ENTMCNC: 31.3 GM/DL — LOW (ref 32–36)
MCHC RBC-ENTMCNC: 33 GM/DL — SIGNIFICANT CHANGE UP (ref 32–36)
MCV RBC AUTO: 85.1 FL — SIGNIFICANT CHANGE UP (ref 80–100)
MCV RBC AUTO: 86.9 FL — SIGNIFICANT CHANGE UP (ref 80–100)
PLATELET # BLD AUTO: 396 K/UL — SIGNIFICANT CHANGE UP (ref 150–400)
PLATELET # BLD AUTO: 441 K/UL — HIGH (ref 150–400)
POTASSIUM SERPL-MCNC: 4.2 MMOL/L — SIGNIFICANT CHANGE UP (ref 3.5–5.3)
POTASSIUM SERPL-SCNC: 4.2 MMOL/L — SIGNIFICANT CHANGE UP (ref 3.5–5.3)
RAPID RVP RESULT: DETECTED
RBC # BLD: 3.15 M/UL — LOW (ref 3.8–5.2)
RBC # BLD: 3.18 M/UL — LOW (ref 3.8–5.2)
RBC # FLD: 12.8 % — SIGNIFICANT CHANGE UP (ref 10.3–14.5)
RBC # FLD: 13.7 % — SIGNIFICANT CHANGE UP (ref 10.3–14.5)
SODIUM SERPL-SCNC: 135 MMOL/L — SIGNIFICANT CHANGE UP (ref 135–145)
WBC # BLD: 7 K/UL — SIGNIFICANT CHANGE UP (ref 3.8–10.5)
WBC # BLD: 7.55 K/UL — SIGNIFICANT CHANGE UP (ref 3.8–10.5)
WBC # FLD AUTO: 7 K/UL — SIGNIFICANT CHANGE UP (ref 3.8–10.5)
WBC # FLD AUTO: 7.55 K/UL — SIGNIFICANT CHANGE UP (ref 3.8–10.5)

## 2017-01-09 PROCEDURE — 99232 SBSQ HOSP IP/OBS MODERATE 35: CPT

## 2017-01-09 PROCEDURE — 99232 SBSQ HOSP IP/OBS MODERATE 35: CPT | Mod: GC

## 2017-01-09 RX ORDER — LISINOPRIL 2.5 MG/1
5 TABLET ORAL DAILY
Qty: 0 | Refills: 0 | Status: DISCONTINUED | OUTPATIENT
Start: 2017-01-09 | End: 2017-01-11

## 2017-01-09 RX ORDER — AMLODIPINE BESYLATE 2.5 MG/1
10 TABLET ORAL DAILY
Qty: 0 | Refills: 0 | Status: DISCONTINUED | OUTPATIENT
Start: 2017-01-09 | End: 2017-01-11

## 2017-01-09 RX ORDER — POLYETHYLENE GLYCOL 3350 17 G/17G
17 POWDER, FOR SOLUTION ORAL
Qty: 0 | Refills: 0 | Status: DISCONTINUED | OUTPATIENT
Start: 2017-01-09 | End: 2017-01-11

## 2017-01-09 RX ORDER — INSULIN LISPRO 100/ML
14 VIAL (ML) SUBCUTANEOUS
Qty: 0 | Refills: 0 | Status: DISCONTINUED | OUTPATIENT
Start: 2017-01-09 | End: 2017-01-10

## 2017-01-09 RX ORDER — SENNA PLUS 8.6 MG/1
2 TABLET ORAL AT BEDTIME
Qty: 0 | Refills: 0 | Status: DISCONTINUED | OUTPATIENT
Start: 2017-01-09 | End: 2017-01-11

## 2017-01-09 RX ORDER — POLYETHYLENE GLYCOL 3350 17 G/17G
17 POWDER, FOR SOLUTION ORAL ONCE
Qty: 0 | Refills: 0 | Status: COMPLETED | OUTPATIENT
Start: 2017-01-09 | End: 2017-01-09

## 2017-01-09 RX ADMIN — Medication 100 MILLIGRAM(S): at 15:13

## 2017-01-09 RX ADMIN — Medication 25 MILLIGRAM(S): at 21:49

## 2017-01-09 RX ADMIN — Medication 650 MILLIGRAM(S): at 06:47

## 2017-01-09 RX ADMIN — Medication 3: at 17:04

## 2017-01-09 RX ADMIN — HEPARIN SODIUM 5000 UNIT(S): 5000 INJECTION INTRAVENOUS; SUBCUTANEOUS at 21:49

## 2017-01-09 RX ADMIN — Medication 10 UNIT(S): at 11:33

## 2017-01-09 RX ADMIN — Medication 14 UNIT(S): at 17:04

## 2017-01-09 RX ADMIN — INSULIN GLARGINE 20 UNIT(S): 100 INJECTION, SOLUTION SUBCUTANEOUS at 21:50

## 2017-01-09 RX ADMIN — SENNA PLUS 2 TABLET(S): 8.6 TABLET ORAL at 21:49

## 2017-01-09 RX ADMIN — Medication 3: at 11:33

## 2017-01-09 RX ADMIN — HEPARIN SODIUM 5000 UNIT(S): 5000 INJECTION INTRAVENOUS; SUBCUTANEOUS at 15:13

## 2017-01-09 RX ADMIN — Medication 25 MILLIGRAM(S): at 15:13

## 2017-01-09 RX ADMIN — HEPARIN SODIUM 5000 UNIT(S): 5000 INJECTION INTRAVENOUS; SUBCUTANEOUS at 05:41

## 2017-01-09 RX ADMIN — Medication 650 MILLIGRAM(S): at 05:47

## 2017-01-09 RX ADMIN — POLYETHYLENE GLYCOL 3350 17 GRAM(S): 17 POWDER, FOR SOLUTION ORAL at 11:32

## 2017-01-09 RX ADMIN — AMLODIPINE BESYLATE 5 MILLIGRAM(S): 2.5 TABLET ORAL at 05:41

## 2017-01-09 RX ADMIN — Medication 100 MILLIGRAM(S): at 21:49

## 2017-01-09 RX ADMIN — Medication 81 MILLIGRAM(S): at 11:34

## 2017-01-09 RX ADMIN — Medication 1: at 08:05

## 2017-01-09 RX ADMIN — Medication 25 MILLIGRAM(S): at 05:41

## 2017-01-09 RX ADMIN — AMLODIPINE BESYLATE 10 MILLIGRAM(S): 2.5 TABLET ORAL at 11:32

## 2017-01-09 RX ADMIN — ATORVASTATIN CALCIUM 40 MILLIGRAM(S): 80 TABLET, FILM COATED ORAL at 21:49

## 2017-01-09 RX ADMIN — Medication 10 UNIT(S): at 08:04

## 2017-01-09 RX ADMIN — PANTOPRAZOLE SODIUM 40 MILLIGRAM(S): 20 TABLET, DELAYED RELEASE ORAL at 05:42

## 2017-01-09 RX ADMIN — POLYETHYLENE GLYCOL 3350 17 GRAM(S): 17 POWDER, FOR SOLUTION ORAL at 21:49

## 2017-01-09 RX ADMIN — Medication 30 MILLIGRAM(S): at 17:03

## 2017-01-10 LAB
ALBUMIN SERPL ELPH-MCNC: 2.7 G/DL — LOW (ref 3.3–5)
ALP SERPL-CCNC: 133 U/L — HIGH (ref 40–120)
ALT FLD-CCNC: 26 U/L — SIGNIFICANT CHANGE UP (ref 10–45)
AMYLASE P1 CFR SERPL: 61 U/L — SIGNIFICANT CHANGE UP (ref 25–125)
ANION GAP SERPL CALC-SCNC: 15 MMOL/L — SIGNIFICANT CHANGE UP (ref 5–17)
AST SERPL-CCNC: 44 U/L — HIGH (ref 10–40)
BILIRUB DIRECT SERPL-MCNC: 0 MG/DL — SIGNIFICANT CHANGE UP (ref 0–0.2)
BILIRUB INDIRECT FLD-MCNC: 0.1 MG/DL — LOW (ref 0.2–1)
BILIRUB SERPL-MCNC: <0.2 MG/DL — SIGNIFICANT CHANGE UP (ref 0.2–1.2)
BUN SERPL-MCNC: 19 MG/DL — SIGNIFICANT CHANGE UP (ref 7–23)
C PEPTIDE SERPL-MCNC: 0.1 NG/ML — LOW (ref 0.9–7.1)
CALCIUM SERPL-MCNC: 8.9 MG/DL — SIGNIFICANT CHANGE UP (ref 8.4–10.5)
CHLORIDE SERPL-SCNC: 102 MMOL/L — SIGNIFICANT CHANGE UP (ref 96–108)
CO2 SERPL-SCNC: 20 MMOL/L — LOW (ref 22–31)
CREAT SERPL-MCNC: 2.42 MG/DL — HIGH (ref 0.5–1.3)
FERRITIN SERPL-MCNC: 60.4 NG/ML — SIGNIFICANT CHANGE UP (ref 15–150)
FOLATE SERPL-MCNC: 14.9 NG/ML — SIGNIFICANT CHANGE UP (ref 4.8–24.2)
GLUCOSE SERPL-MCNC: 175 MG/DL — HIGH (ref 70–99)
HCT VFR BLD CALC: 26.4 % — LOW (ref 34.5–45)
HGB BLD-MCNC: 8.2 G/DL — LOW (ref 11.5–15.5)
IRON SATN MFR SERPL: 10 % — LOW (ref 14–50)
IRON SATN MFR SERPL: 25 UG/DL — LOW (ref 30–160)
LIDOCAIN IGE QN: 88 U/L — HIGH (ref 7–60)
MCHC RBC-ENTMCNC: 26.9 PG — LOW (ref 27–34)
MCHC RBC-ENTMCNC: 31.1 GM/DL — LOW (ref 32–36)
MCV RBC AUTO: 86.6 FL — SIGNIFICANT CHANGE UP (ref 80–100)
PLATELET # BLD AUTO: 450 K/UL — HIGH (ref 150–400)
POTASSIUM SERPL-MCNC: 4.4 MMOL/L — SIGNIFICANT CHANGE UP (ref 3.5–5.3)
POTASSIUM SERPL-SCNC: 4.4 MMOL/L — SIGNIFICANT CHANGE UP (ref 3.5–5.3)
PROT SERPL-MCNC: 6 G/DL — SIGNIFICANT CHANGE UP (ref 6–8.3)
RBC # BLD: 3.05 M/UL — LOW (ref 3.8–5.2)
RBC # FLD: 13.9 % — SIGNIFICANT CHANGE UP (ref 10.3–14.5)
SODIUM SERPL-SCNC: 137 MMOL/L — SIGNIFICANT CHANGE UP (ref 135–145)
TIBC SERPL-MCNC: 255 UG/DL — SIGNIFICANT CHANGE UP (ref 220–430)
UIBC SERPL-MCNC: 230 UG/DL — SIGNIFICANT CHANGE UP (ref 110–370)
VIT B12 SERPL-MCNC: 1407 PG/ML — HIGH (ref 243–894)
WBC # BLD: 7.56 K/UL — SIGNIFICANT CHANGE UP (ref 3.8–10.5)
WBC # FLD AUTO: 7.56 K/UL — SIGNIFICANT CHANGE UP (ref 3.8–10.5)

## 2017-01-10 PROCEDURE — 99232 SBSQ HOSP IP/OBS MODERATE 35: CPT

## 2017-01-10 PROCEDURE — 99233 SBSQ HOSP IP/OBS HIGH 50: CPT

## 2017-01-10 RX ORDER — HYDRALAZINE HCL 50 MG
1 TABLET ORAL
Qty: 0 | Refills: 0 | COMMUNITY
Start: 2017-01-10

## 2017-01-10 RX ORDER — ASPIRIN/CALCIUM CARB/MAGNESIUM 324 MG
1 TABLET ORAL
Qty: 0 | Refills: 0 | COMMUNITY
Start: 2017-01-10

## 2017-01-10 RX ORDER — INSULIN GLARGINE 100 [IU]/ML
22 INJECTION, SOLUTION SUBCUTANEOUS AT BEDTIME
Qty: 0 | Refills: 0 | Status: DISCONTINUED | OUTPATIENT
Start: 2017-01-10 | End: 2017-01-11

## 2017-01-10 RX ORDER — METOPROLOL TARTRATE 50 MG
1 TABLET ORAL
Qty: 0 | Refills: 0 | COMMUNITY
Start: 2017-01-10

## 2017-01-10 RX ORDER — LISINOPRIL 2.5 MG/1
1 TABLET ORAL
Qty: 0 | Refills: 0 | COMMUNITY
Start: 2017-01-10

## 2017-01-10 RX ORDER — POLYETHYLENE GLYCOL 3350 17 G/17G
17 POWDER, FOR SOLUTION ORAL
Qty: 0 | Refills: 0 | COMMUNITY
Start: 2017-01-10

## 2017-01-10 RX ORDER — INSULIN LISPRO 100/ML
15 VIAL (ML) SUBCUTANEOUS
Qty: 0 | Refills: 0 | Status: DISCONTINUED | OUTPATIENT
Start: 2017-01-10 | End: 2017-01-11

## 2017-01-10 RX ORDER — ATORVASTATIN CALCIUM 80 MG/1
1 TABLET, FILM COATED ORAL
Qty: 0 | Refills: 0 | COMMUNITY
Start: 2017-01-10

## 2017-01-10 RX ORDER — AMLODIPINE BESYLATE 2.5 MG/1
1 TABLET ORAL
Qty: 0 | Refills: 0 | COMMUNITY
Start: 2017-01-10

## 2017-01-10 RX ORDER — SENNA PLUS 8.6 MG/1
2 TABLET ORAL
Qty: 0 | Refills: 0 | COMMUNITY
Start: 2017-01-10

## 2017-01-10 RX ORDER — PANTOPRAZOLE SODIUM 20 MG/1
1 TABLET, DELAYED RELEASE ORAL
Qty: 0 | Refills: 0 | COMMUNITY
Start: 2017-01-10

## 2017-01-10 RX ORDER — DOCUSATE SODIUM 100 MG
1 CAPSULE ORAL
Qty: 0 | Refills: 0 | COMMUNITY
Start: 2017-01-10

## 2017-01-10 RX ORDER — FERROUS SULFATE 325(65) MG
325 TABLET ORAL DAILY
Qty: 0 | Refills: 0 | Status: DISCONTINUED | OUTPATIENT
Start: 2017-01-10 | End: 2017-01-11

## 2017-01-10 RX ADMIN — Medication 100 MILLIGRAM(S): at 22:13

## 2017-01-10 RX ADMIN — HEPARIN SODIUM 5000 UNIT(S): 5000 INJECTION INTRAVENOUS; SUBCUTANEOUS at 22:13

## 2017-01-10 RX ADMIN — Medication 200 MILLIGRAM(S): at 03:51

## 2017-01-10 RX ADMIN — POLYETHYLENE GLYCOL 3350 17 GRAM(S): 17 POWDER, FOR SOLUTION ORAL at 06:26

## 2017-01-10 RX ADMIN — AMLODIPINE BESYLATE 10 MILLIGRAM(S): 2.5 TABLET ORAL at 06:25

## 2017-01-10 RX ADMIN — Medication 100 MILLIGRAM(S): at 06:25

## 2017-01-10 RX ADMIN — LISINOPRIL 5 MILLIGRAM(S): 2.5 TABLET ORAL at 06:25

## 2017-01-10 RX ADMIN — Medication 30 MILLIGRAM(S): at 06:25

## 2017-01-10 RX ADMIN — HEPARIN SODIUM 5000 UNIT(S): 5000 INJECTION INTRAVENOUS; SUBCUTANEOUS at 06:25

## 2017-01-10 RX ADMIN — Medication 30 MILLIGRAM(S): at 18:12

## 2017-01-10 RX ADMIN — Medication 81 MILLIGRAM(S): at 12:29

## 2017-01-10 RX ADMIN — Medication 25 MILLIGRAM(S): at 06:25

## 2017-01-10 RX ADMIN — SENNA PLUS 2 TABLET(S): 8.6 TABLET ORAL at 23:10

## 2017-01-10 RX ADMIN — Medication 14 UNIT(S): at 07:59

## 2017-01-10 RX ADMIN — INSULIN GLARGINE 22 UNIT(S): 100 INJECTION, SOLUTION SUBCUTANEOUS at 22:11

## 2017-01-10 RX ADMIN — POLYETHYLENE GLYCOL 3350 17 GRAM(S): 17 POWDER, FOR SOLUTION ORAL at 18:12

## 2017-01-10 RX ADMIN — HEPARIN SODIUM 5000 UNIT(S): 5000 INJECTION INTRAVENOUS; SUBCUTANEOUS at 13:57

## 2017-01-10 RX ADMIN — Medication 6.25 MILLIGRAM(S): at 22:13

## 2017-01-10 RX ADMIN — Medication 325 MILLIGRAM(S): at 18:12

## 2017-01-10 RX ADMIN — Medication 25 MILLIGRAM(S): at 13:57

## 2017-01-10 RX ADMIN — Medication 2: at 12:29

## 2017-01-10 RX ADMIN — Medication 25 MILLIGRAM(S): at 23:10

## 2017-01-10 RX ADMIN — ATORVASTATIN CALCIUM 40 MILLIGRAM(S): 80 TABLET, FILM COATED ORAL at 22:13

## 2017-01-10 RX ADMIN — PANTOPRAZOLE SODIUM 40 MILLIGRAM(S): 20 TABLET, DELAYED RELEASE ORAL at 06:25

## 2017-01-10 RX ADMIN — Medication 14 UNIT(S): at 12:29

## 2017-01-10 RX ADMIN — Medication 15 UNIT(S): at 18:12

## 2017-01-10 RX ADMIN — Medication 1: at 07:59

## 2017-01-10 RX ADMIN — Medication 6.25 MILLIGRAM(S): at 12:29

## 2017-01-10 RX ADMIN — Medication 100 MILLIGRAM(S): at 13:57

## 2017-01-11 VITALS
OXYGEN SATURATION: 95 % | HEART RATE: 86 BPM | DIASTOLIC BLOOD PRESSURE: 71 MMHG | SYSTOLIC BLOOD PRESSURE: 115 MMHG | TEMPERATURE: 98 F | RESPIRATION RATE: 18 BRPM

## 2017-01-11 LAB
ANION GAP SERPL CALC-SCNC: 13 MMOL/L — SIGNIFICANT CHANGE UP (ref 5–17)
BUN SERPL-MCNC: 22 MG/DL — SIGNIFICANT CHANGE UP (ref 7–23)
CALCIUM SERPL-MCNC: 8.4 MG/DL — SIGNIFICANT CHANGE UP (ref 8.4–10.5)
CHLORIDE SERPL-SCNC: 102 MMOL/L — SIGNIFICANT CHANGE UP (ref 96–108)
CO2 SERPL-SCNC: 21 MMOL/L — LOW (ref 22–31)
CREAT SERPL-MCNC: 2.79 MG/DL — HIGH (ref 0.5–1.3)
GLUCOSE SERPL-MCNC: 267 MG/DL — HIGH (ref 70–99)
HCT VFR BLD CALC: 26.3 % — LOW (ref 34.5–45)
HGB BLD-MCNC: 8.2 G/DL — LOW (ref 11.5–15.5)
MCHC RBC-ENTMCNC: 27.1 PG — SIGNIFICANT CHANGE UP (ref 27–34)
MCHC RBC-ENTMCNC: 31.2 GM/DL — LOW (ref 32–36)
MCV RBC AUTO: 86.8 FL — SIGNIFICANT CHANGE UP (ref 80–100)
OB PNL STL: NEGATIVE — SIGNIFICANT CHANGE UP
PLATELET # BLD AUTO: 465 K/UL — HIGH (ref 150–400)
POTASSIUM SERPL-MCNC: 4.7 MMOL/L — SIGNIFICANT CHANGE UP (ref 3.5–5.3)
POTASSIUM SERPL-SCNC: 4.7 MMOL/L — SIGNIFICANT CHANGE UP (ref 3.5–5.3)
RBC # BLD: 3.03 M/UL — LOW (ref 3.8–5.2)
RBC # FLD: 14 % — SIGNIFICANT CHANGE UP (ref 10.3–14.5)
SODIUM SERPL-SCNC: 136 MMOL/L — SIGNIFICANT CHANGE UP (ref 135–145)
WBC # BLD: 7.42 K/UL — SIGNIFICANT CHANGE UP (ref 3.8–10.5)
WBC # FLD AUTO: 7.42 K/UL — SIGNIFICANT CHANGE UP (ref 3.8–10.5)

## 2017-01-11 PROCEDURE — 83550 IRON BINDING TEST: CPT

## 2017-01-11 PROCEDURE — 82550 ASSAY OF CK (CPK): CPT

## 2017-01-11 PROCEDURE — 84681 ASSAY OF C-PEPTIDE: CPT

## 2017-01-11 PROCEDURE — 82728 ASSAY OF FERRITIN: CPT

## 2017-01-11 PROCEDURE — 83690 ASSAY OF LIPASE: CPT

## 2017-01-11 PROCEDURE — 80048 BASIC METABOLIC PNL TOTAL CA: CPT

## 2017-01-11 PROCEDURE — 87798 DETECT AGENT NOS DNA AMP: CPT

## 2017-01-11 PROCEDURE — 99232 SBSQ HOSP IP/OBS MODERATE 35: CPT

## 2017-01-11 PROCEDURE — 87633 RESP VIRUS 12-25 TARGETS: CPT

## 2017-01-11 PROCEDURE — C1769: CPT

## 2017-01-11 PROCEDURE — 82553 CREATINE MB FRACTION: CPT

## 2017-01-11 PROCEDURE — 99233 SBSQ HOSP IP/OBS HIGH 50: CPT

## 2017-01-11 PROCEDURE — 85027 COMPLETE CBC AUTOMATED: CPT

## 2017-01-11 PROCEDURE — 87486 CHLMYD PNEUM DNA AMP PROBE: CPT

## 2017-01-11 PROCEDURE — 82746 ASSAY OF FOLIC ACID SERUM: CPT

## 2017-01-11 PROCEDURE — 84133 ASSAY OF URINE POTASSIUM: CPT

## 2017-01-11 PROCEDURE — 83935 ASSAY OF URINE OSMOLALITY: CPT

## 2017-01-11 PROCEDURE — 85610 PROTHROMBIN TIME: CPT

## 2017-01-11 PROCEDURE — 82340 ASSAY OF CALCIUM IN URINE: CPT

## 2017-01-11 PROCEDURE — 82436 ASSAY OF URINE CHLORIDE: CPT

## 2017-01-11 PROCEDURE — 86341 ISLET CELL ANTIBODY: CPT

## 2017-01-11 PROCEDURE — 82607 VITAMIN B-12: CPT

## 2017-01-11 PROCEDURE — 80076 HEPATIC FUNCTION PANEL: CPT

## 2017-01-11 PROCEDURE — 84300 ASSAY OF URINE SODIUM: CPT

## 2017-01-11 PROCEDURE — C1894: CPT

## 2017-01-11 PROCEDURE — 82272 OCCULT BLD FECES 1-3 TESTS: CPT

## 2017-01-11 PROCEDURE — C1887: CPT

## 2017-01-11 PROCEDURE — 87581 M.PNEUMON DNA AMP PROBE: CPT

## 2017-01-11 PROCEDURE — 85730 THROMBOPLASTIN TIME PARTIAL: CPT

## 2017-01-11 PROCEDURE — 84484 ASSAY OF TROPONIN QUANT: CPT

## 2017-01-11 PROCEDURE — 82150 ASSAY OF AMYLASE: CPT

## 2017-01-11 PROCEDURE — 93005 ELECTROCARDIOGRAM TRACING: CPT

## 2017-01-11 PROCEDURE — 93458 L HRT ARTERY/VENTRICLE ANGIO: CPT

## 2017-01-11 PROCEDURE — 82570 ASSAY OF URINE CREATININE: CPT

## 2017-01-11 RX ORDER — HYDRALAZINE HCL 50 MG
1 TABLET ORAL
Qty: 90 | Refills: 0 | OUTPATIENT
Start: 2017-01-11 | End: 2017-02-10

## 2017-01-11 RX ORDER — ENOXAPARIN SODIUM 100 MG/ML
26 INJECTION SUBCUTANEOUS
Qty: 0 | Refills: 0 | COMMUNITY

## 2017-01-11 RX ORDER — METOPROLOL TARTRATE 50 MG
1 TABLET ORAL
Qty: 30 | Refills: 0 | OUTPATIENT
Start: 2017-01-11 | End: 2017-02-10

## 2017-01-11 RX ORDER — ASPIRIN/CALCIUM CARB/MAGNESIUM 324 MG
1 TABLET ORAL
Qty: 30 | Refills: 0 | OUTPATIENT
Start: 2017-01-11 | End: 2017-02-10

## 2017-01-11 RX ORDER — AMLODIPINE BESYLATE 2.5 MG/1
1 TABLET ORAL
Qty: 30 | Refills: 0 | OUTPATIENT
Start: 2017-01-11 | End: 2017-02-10

## 2017-01-11 RX ORDER — ENOXAPARIN SODIUM 100 MG/ML
22 INJECTION SUBCUTANEOUS
Qty: 0 | Refills: 0 | COMMUNITY

## 2017-01-11 RX ORDER — FERROUS SULFATE 325(65) MG
1 TABLET ORAL
Qty: 90 | Refills: 0 | OUTPATIENT
Start: 2017-01-11 | End: 2017-02-10

## 2017-01-11 RX ORDER — INSULIN LISPRO 100/ML
15 VIAL (ML) SUBCUTANEOUS
Qty: 0 | Refills: 0 | COMMUNITY

## 2017-01-11 RX ORDER — INSULIN LISPRO 100/ML
18 VIAL (ML) SUBCUTANEOUS
Qty: 0 | Refills: 0 | COMMUNITY

## 2017-01-11 RX ORDER — ENOXAPARIN SODIUM 100 MG/ML
22 INJECTION SUBCUTANEOUS
Qty: 1 | Refills: 0 | OUTPATIENT
Start: 2017-01-11 | End: 2017-02-10

## 2017-01-11 RX ORDER — PANTOPRAZOLE SODIUM 20 MG/1
1 TABLET, DELAYED RELEASE ORAL
Qty: 30 | Refills: 0 | OUTPATIENT
Start: 2017-01-11 | End: 2017-02-10

## 2017-01-11 RX ORDER — SENNA PLUS 8.6 MG/1
2 TABLET ORAL
Qty: 60 | Refills: 0 | OUTPATIENT
Start: 2017-01-11 | End: 2017-02-10

## 2017-01-11 RX ORDER — POLYETHYLENE GLYCOL 3350 17 G/17G
17 POWDER, FOR SOLUTION ORAL
Qty: 1020 | Refills: 0 | OUTPATIENT
Start: 2017-01-11 | End: 2017-02-10

## 2017-01-11 RX ORDER — DOCUSATE SODIUM 100 MG
1 CAPSULE ORAL
Qty: 90 | Refills: 0 | OUTPATIENT
Start: 2017-01-11 | End: 2017-02-10

## 2017-01-11 RX ORDER — INSULIN LISPRO 100/ML
15 VIAL (ML) SUBCUTANEOUS
Qty: 1 | Refills: 0 | OUTPATIENT
Start: 2017-01-11 | End: 2017-02-10

## 2017-01-11 RX ORDER — ATORVASTATIN CALCIUM 80 MG/1
1 TABLET, FILM COATED ORAL
Qty: 30 | Refills: 0 | OUTPATIENT
Start: 2017-01-11 | End: 2017-02-10

## 2017-01-11 RX ORDER — LISINOPRIL 2.5 MG/1
1 TABLET ORAL
Qty: 30 | Refills: 0 | OUTPATIENT
Start: 2017-01-11 | End: 2017-02-10

## 2017-01-11 RX ADMIN — Medication 25 MILLIGRAM(S): at 06:20

## 2017-01-11 RX ADMIN — Medication 3: at 08:13

## 2017-01-11 RX ADMIN — AMLODIPINE BESYLATE 10 MILLIGRAM(S): 2.5 TABLET ORAL at 06:20

## 2017-01-11 RX ADMIN — HEPARIN SODIUM 5000 UNIT(S): 5000 INJECTION INTRAVENOUS; SUBCUTANEOUS at 06:20

## 2017-01-11 RX ADMIN — Medication 5: at 12:32

## 2017-01-11 RX ADMIN — PANTOPRAZOLE SODIUM 40 MILLIGRAM(S): 20 TABLET, DELAYED RELEASE ORAL at 06:20

## 2017-01-11 RX ADMIN — Medication 15 UNIT(S): at 12:32

## 2017-01-11 RX ADMIN — Medication 25 MILLIGRAM(S): at 12:38

## 2017-01-11 RX ADMIN — Medication 30 MILLIGRAM(S): at 06:20

## 2017-01-11 RX ADMIN — Medication 6.25 MILLIGRAM(S): at 06:20

## 2017-01-11 RX ADMIN — Medication 325 MILLIGRAM(S): at 12:33

## 2017-01-11 RX ADMIN — HEPARIN SODIUM 5000 UNIT(S): 5000 INJECTION INTRAVENOUS; SUBCUTANEOUS at 12:38

## 2017-01-11 RX ADMIN — Medication 15 UNIT(S): at 08:13

## 2017-01-11 RX ADMIN — Medication 100 MILLIGRAM(S): at 06:20

## 2017-01-11 RX ADMIN — Medication 100 MILLIGRAM(S): at 12:37

## 2017-01-11 RX ADMIN — Medication 81 MILLIGRAM(S): at 12:33

## 2017-01-11 RX ADMIN — LISINOPRIL 5 MILLIGRAM(S): 2.5 TABLET ORAL at 06:20

## 2017-01-13 LAB — GAD65 AB SER-MCNC: 18 NMOL/L — HIGH

## 2017-01-18 ENCOUNTER — APPOINTMENT (OUTPATIENT)
Dept: ENDOCRINOLOGY | Facility: CLINIC | Age: 52
End: 2017-01-18

## 2017-02-02 PROBLEM — N18.4 CHRONIC KIDNEY DISEASE, STAGE 4 (SEVERE): Chronic | Status: ACTIVE | Noted: 2017-01-05

## 2017-02-02 PROBLEM — K85.90 ACUTE PANCREATITIS WITHOUT NECROSIS OR INFECTION, UNSPECIFIED: Chronic | Status: ACTIVE | Noted: 2017-01-05

## 2017-02-02 PROBLEM — E78.5 HYPERLIPIDEMIA, UNSPECIFIED: Chronic | Status: ACTIVE | Noted: 2017-01-05

## 2017-03-24 ENCOUNTER — APPOINTMENT (OUTPATIENT)
Dept: ENDOCRINOLOGY | Facility: CLINIC | Age: 52
End: 2017-03-24

## 2017-10-12 NOTE — ED ADULT NURSE NOTE - TEMPLATE LIST FOR HEAD TO TOE ASSESSMENT
I have reviewed discharge instructions with the patient. The patient verbalized understanding. Patient armband removed and shredded. VSS. Patient offered medical transport but  wanted to bring patient home. Patient brought out to car in wheelchair by ED tech to help  get patient in car.  Patients  provided with wound dressing supplies to get him through tonight Fluid/Electrolyte/Metabolic

## 2020-12-11 NOTE — PHYSICAL THERAPY INITIAL EVALUATION ADULT - LEVEL OF INDEPENDENCE: GAIT, REHAB EVAL
REFERRAL IN [facility] AND LINKED TO APPT   moderate assist (50% patients effort)/PT holding onto pt in front

## 2025-04-10 NOTE — H&P ADULT. - NS NEC GEN PE MLT EXAM PC
Please remember to answer the phone call to accept your IUD  We will call you when it arrives and have you come in when you are on your period for insertion.  You can take Ibuprofen 600 mg about 1 hour before your appointment to help with the cramping.   Thanks for coming to see us today and letting us take care of you!     No bruits; no thyromegaly or nodules